# Patient Record
Sex: FEMALE | HISPANIC OR LATINO | Employment: OTHER | URBAN - METROPOLITAN AREA
[De-identification: names, ages, dates, MRNs, and addresses within clinical notes are randomized per-mention and may not be internally consistent; named-entity substitution may affect disease eponyms.]

---

## 2024-03-06 NOTE — PROGRESS NOTES
"Subjective   Patient ID:   [id number]   Brittany Summers is a 68 y.o. female who presents for Allergies (NPV, saw allergist  last year ).    Chief Complaint   Patient presents with    Allergies     NPV, saw allergist  last year       This is a new patient presenting for evaluation of allergy symptoms.    Patient reports a H/O frequent visits to [facility] for C/O rhinorrhea with clear fluid, PND, cough and sneezing Sx and was told she had allergies.  She thought her ere due to frequent \"colds,\" but the cough lingered for weeks.  She describes the cough as dry, forceful and not productive.  Her Sx started after moving here 45 years ago and were triggered by cold weather.      Patient saw Dr. Jose Alfredo Beverly, Allergist at [facility], 10-11-22 and had a skin test positive to Toribio grass and tyrel, maple and cottonwood trees.   She was given Flonase and azelastine nasal sprays, which she used a few weeks but were ineffective.  She has tried antihistamines but she is unsure if they helped.  Eventually the Sx resolved.  The past few weeks she experienced a left earache which she thinks may have been viral-related.    Patient notes she went to [place] last year while it was spring there and 1-2 days later, she was Sx-free.  She went to [place] and had no Sx but developed Sx when she returned here.     Patient will be visiting [facility] 3-17-24 to see her sister where it is rainy and cold.      Patient states she had asthma in the past.    Review of Systems   HENT:  Positive for ear pain.    Respiratory:  Positive for cough.      Objective     /60   Wt 53.5 kg (118 lb)   SpO2 97%      Physical Exam  Constitutional:       Appearance: Normal appearance.   HENT:      Head: Normocephalic and atraumatic.      Right Ear: External ear normal. There is no impacted cerumen.      Left Ear: External ear normal. There is no impacted cerumen.      Nose: No congestion or rhinorrhea.   Eyes:      Extraocular Movements: Extraocular movements " intact.      Conjunctiva/sclera: Conjunctivae normal.      Pupils: Pupils are equal, round, and reactive to light.   Cardiovascular:      Rate and Rhythm: Normal rate and regular rhythm.      Heart sounds: No murmur heard.     No friction rub. No gallop.   Pulmonary:      Effort: No respiratory distress.      Breath sounds: No wheezing, rhonchi or rales.   Skin:     General: Skin is warm and dry.   Neurological:      Mental Status: She is alert.   Psychiatric:         Mood and Affect: Mood normal.         Behavior: Behavior normal.     I reviewed the records from Dr. Beverly's office    Assessment/Plan     Allergic rhinitis  Patient saw Dr. Jose Alfredo Beverly, Allergist at [facility], 10-11-22 and had SPT positive to Toribio grass and tyrel, maple and cottonwood trees.  She used Flonase and azelastine and antihistamines with no relief.    She will start Singulair at the onset of symptoms. If symptoms persist she will start Flonase sensimist 2 spray each nostrils daily    Chronic cough  She C/O dry, nonproductive cough triggered by allergies and cold weather.      She will use the peak flow meter morning and afternoon for one week and check it again when she starts to cough.      By signing my name below, I, Rozina Alejo, attest that this documentation has been prepared under the direction and in the presence of Viviane Patel MD.  All medical record entries made by the Scribe were at my direction and personally dictated by me. I have reviewed the chart and agree that the record accurately reflects my personal performance of the history, physical exam, discussion and plan.

## 2024-03-07 ENCOUNTER — CONSULT (OUTPATIENT)
Dept: ALLERGY | Facility: CLINIC | Age: 69
End: 2024-03-07
Payer: MEDICARE

## 2024-03-07 VITALS — OXYGEN SATURATION: 97 % | DIASTOLIC BLOOD PRESSURE: 60 MMHG | SYSTOLIC BLOOD PRESSURE: 120 MMHG | WEIGHT: 118 LBS

## 2024-03-07 DIAGNOSIS — R05.3 CHRONIC COUGH: ICD-10-CM

## 2024-03-07 DIAGNOSIS — J30.9 ALLERGIC RHINITIS, UNSPECIFIED SEASONALITY, UNSPECIFIED TRIGGER: Primary | ICD-10-CM

## 2024-03-07 PROCEDURE — 99204 OFFICE O/P NEW MOD 45 MIN: CPT | Performed by: ALLERGY & IMMUNOLOGY

## 2024-03-07 RX ORDER — VALACYCLOVIR HYDROCHLORIDE 500 MG/1
500 TABLET, FILM COATED ORAL 2 TIMES DAILY
COMMUNITY

## 2024-03-07 ASSESSMENT — ENCOUNTER SYMPTOMS: COUGH: 1

## 2024-03-07 NOTE — ASSESSMENT & PLAN NOTE
Patient saw Dr. Jose Alfredo Beverly, Allergist at [facility], 10-11-22 and had SPT positive to Toribio grass and tyrel, maple and cottonwood trees.  She used Flonase and azelastine and antihistamines with no relief.    She will start Singulair at the onset of symptoms. If symptoms persist she will start Flonase sensimist 2 spray each nostrils daily

## 2024-03-07 NOTE — ASSESSMENT & PLAN NOTE
She C/O dry, nonproductive cough triggered by allergies and cold weather.      She will use the peak flow meter morning and afternoon for one week and check it again when she starts to cough.

## 2024-03-07 NOTE — PATIENT INSTRUCTIONS
When symptoms start, begin taking Singulair at bedtime to help decrease sinus and lung inflammation.  You can take it with you in [place] if you need it.  Side effects reported vivid dreams,  mood changes as well as ear popping.  If you experience ear popping, this indicates the medication is working so please continue to take it.     If no improvement after 2 weeks on Singulair, start over the counter Flonase Sensimist one spray each nostril two times a day.  To increase the efficacy of your nasal spray, be sure to look down while using it.?  Spray slightly away from the direction of your nasal septum (the bone in the middle of your nose) and only sniff after you have sprayed - avoid spraying and sniffing at the same time, or else a lot of spray will go down your throat.    Use peak flow meter morning and afternoon for one week and check it again when you start to cough.  Keep a log and mail, fax or email it to the office at [email or web address].     Follow up in 6 weeks or sooner if symptoms worsen prior.     Dr. Jose Alfredo Beverly, Allergist at [facility] who performed skin test 10-11-22.

## 2024-04-17 NOTE — PROGRESS NOTES
"Subjective   Patient ID:   [id number]   Brittany Summers is a 68 y.o. female who presents for Allergies (6 week follow up. Did not start singulair. 1 week PFM).    Chief Complaint   Patient presents with    Allergies     6 week follow up. Did not start singulair. 1 week PFM      Since last visit, 3-7-24, patient reports she was prescribed Singulair last visit.  She did perform peak flow meter and notes levels were consistently lower in the morning than in the afternoon.      Patient thinks she only had Sx (sneezing and maybe cough) in the morning but Sx \"disappear\" so she did not start the Singulair.   She will cough for a prolonged period of time after a viral illness.  The last \"cold\" she had was Feb 2024.  She went on her trip to [place] and after she returned 3 weeks ago, but has had no problem since.  Patient does have tea every day and adds a tablespoon of honey.      Patient estimates late June is when she typically starts having symptoms though she is asymptomatic currently.       Objective     /62 (BP Location: Left arm, Patient Position: Sitting, BP Cuff Size: Adult)   Pulse 82   SpO2 100%      Physical Exam  Constitutional:       Appearance: Normal appearance.   HENT:      Head: Normocephalic and atraumatic.      Right Ear: External ear normal. There is no impacted cerumen.      Left Ear: External ear normal. There is no impacted cerumen.      Nose: No congestion or rhinorrhea.   Eyes:      Extraocular Movements: Extraocular movements intact.      Conjunctiva/sclera: Conjunctivae normal.      Pupils: Pupils are equal, round, and reactive to light.   Cardiovascular:      Rate and Rhythm: Normal rate and regular rhythm.      Heart sounds: No murmur heard.     No friction rub. No gallop.   Pulmonary:      Effort: No respiratory distress.      Breath sounds: No wheezing, rhonchi or rales.   Skin:     General: Skin is warm and dry.   Neurological:      Mental Status: She is alert.   Psychiatric:         " "Mood and Affect: Mood normal.         Behavior: Behavior normal.     Assessment/Plan         Allergic rhinitis  This is most likely increasing her frequency of \"cold symptoms\". I would like her to start Singulair at the onset of symptoms and let me know if it is helpful. I am hopeful that this will decrease her lingering post infectious cough.       By signing my name below, I, Mary Alejoibe, attest that this documentation has been prepared under the direction and in the presence of Viviane Patel MD.  All medical record entries made by the Scribe were at my direction and personally dictated by me. I have reviewed the chart and agree that the record accurately reflects my personal performance of the history, physical exam, discussion and plan.     "

## 2024-04-18 ENCOUNTER — OFFICE VISIT (OUTPATIENT)
Dept: ALLERGY | Facility: CLINIC | Age: 69
End: 2024-04-18
Payer: MEDICARE

## 2024-04-18 VITALS — HEART RATE: 82 BPM | DIASTOLIC BLOOD PRESSURE: 62 MMHG | OXYGEN SATURATION: 100 % | SYSTOLIC BLOOD PRESSURE: 166 MMHG

## 2024-04-18 DIAGNOSIS — J30.1 SEASONAL ALLERGIC RHINITIS DUE TO POLLEN: Primary | ICD-10-CM

## 2024-04-18 PROCEDURE — 1159F MED LIST DOCD IN RCRD: CPT | Performed by: ALLERGY & IMMUNOLOGY

## 2024-04-18 PROCEDURE — 1036F TOBACCO NON-USER: CPT | Performed by: ALLERGY & IMMUNOLOGY

## 2024-04-18 PROCEDURE — 99214 OFFICE O/P EST MOD 30 MIN: CPT | Performed by: ALLERGY & IMMUNOLOGY

## 2024-04-18 PROCEDURE — 1160F RVW MEDS BY RX/DR IN RCRD: CPT | Performed by: ALLERGY & IMMUNOLOGY

## 2024-04-18 NOTE — PATIENT INSTRUCTIONS
Start Singulair at onset of symptoms.    Check peak flow meter when you start coughing.  If you restart Singulair and it helps, let me know.    Follow up in 1 year or sooner if symptoms worsen prior.

## 2024-04-22 NOTE — ASSESSMENT & PLAN NOTE
"This is most likely increasing her frequency of \"cold symptoms\". I would like her to start Singulair at the onset of symptoms and let me know if it is helpful. I am hopeful that this will decrease her lingering post infectious cough.   "

## 2024-06-26 ENCOUNTER — APPOINTMENT (OUTPATIENT)
Dept: RHEUMATOLOGY | Facility: CLINIC | Age: 69
End: 2024-06-26
Payer: MEDICARE

## 2024-06-26 VITALS
SYSTOLIC BLOOD PRESSURE: 106 MMHG | HEART RATE: 85 BPM | BODY MASS INDEX: 23.36 KG/M2 | HEIGHT: 60 IN | DIASTOLIC BLOOD PRESSURE: 60 MMHG | WEIGHT: 119 LBS

## 2024-06-26 DIAGNOSIS — M19.041 PRIMARY OSTEOARTHRITIS OF BOTH HANDS: Primary | ICD-10-CM

## 2024-06-26 DIAGNOSIS — M19.042 PRIMARY OSTEOARTHRITIS OF BOTH HANDS: Primary | ICD-10-CM

## 2024-06-26 PROCEDURE — 99213 OFFICE O/P EST LOW 20 MIN: CPT | Performed by: INTERNAL MEDICINE

## 2024-06-26 PROCEDURE — 1126F AMNT PAIN NOTED NONE PRSNT: CPT | Performed by: INTERNAL MEDICINE

## 2024-06-26 PROCEDURE — 1036F TOBACCO NON-USER: CPT | Performed by: INTERNAL MEDICINE

## 2024-06-26 PROCEDURE — 1159F MED LIST DOCD IN RCRD: CPT | Performed by: INTERNAL MEDICINE

## 2024-06-26 ASSESSMENT — PAIN SCALES - GENERAL: PAINLEVEL: 0-NO PAIN

## 2024-06-26 NOTE — PROGRESS NOTES
Informants: Patient and EMR.  PP: 69-year-old female with no significant past medical history.  CC: Pain in fingers.  [place]: She has been in her usual state of health until a few years ago when she began to note some discomfort involving the DIP joint of the digits of both hands.  She has some bony prominence of the DIP joint of the second digit of both hands.  She has not noted any significant morning stiffness, rashes, Raynaud's phenomena, or joint effusions.  She has occasional canker sores for which she has been taking acyclovir.  She notes some blurring of vision, vaginal dryness, occasional vertigo.  She does not take any medications for the pain.  She has continued to be active employed as a .  She developed left rotator cuff tear that was managed conservatively without surgery.  She has been able to maintain full range of motion of the left shoulder without pain.  PH: Allergies: Leflunomide (pruritic rash), omeprazole (rash); illnesses: Allergic rhinitis, left rotator cuff tear; surgeries: None.  SH: She denies any tobacco alcohol or illicit drug use.  She is employed as a , .  FH: Father  93 years of age of old age.  Mother  at 98 years of age of old age.  She has a brother who  with lung cancer and another brother who  with heart disease.  She has a brother alive with hypertension and a brother who is healthy.  She has a sister who  with stomach cancer and 2 sisters were healthy.  She has 1 child who is healthy.  She has a grandmother with asthma.  PX: She is a well-developed, well-nourished, female with benign head, neck, lungs, heart, and abdomen, and extremities.  The musculoskeletal examination does not show any joint effusions.  There is slight bony prominence of the DIP joint of the second digit on both hands.  There is good passive range of motion of the upper and lower extremity joints without joint effusions.  X-ray left shoulder  (4/6/2023)Mild osteoarthritis of the acromioclavicular and mild to moderate arthritis of the glenohumeral joint.  There is osteopenia.  There are no fractures or erosions.    MRI left shoulder (3/21/2023) large full-thickness supraspinatus tendon tear measuring approximately 1.5 cm with retraction of the mid and posterior tendon fibers.  There is tendinosis anterior to the full-thickness tear with possible small partial-thickness tear of the articular surface along the posterior aspect of the tendon and possible full-thickness tear.  There is a small to moderate glenohumeral and subacromial/subdeltoid bursal fluid.  There is synovial thickening in the glenohumeral joint and in the bursa.  There is a split tear of the long head of the biceps tendon and in the superior aspect of the labrum.  There is mild to moderate hypertrophy of the acromioclavicular joint.  X-ray right ankle (5/12/2021) small calcaneal enthesophyte at the insertion of the plantar fascia.  Impression: 69-year-old female with a history of left rotator cuff tear presenting with discomfort in the digits of both hands with mild bony prominence of the DIP joint of the second digit of both hands.  There is no extra-articular manifestations to suggest spondylitis, rheumatoid arthritis, connective tissue disorder or crystal induced arthritis.  Plan: She is to continue being physically active.  She may consider taking acetaminophen as needed for more significant pain.  Consider radiographs of the hands and laboratory for evaluation for any signs of inflammatory arthritis if symptoms were to worsen.  She is to return in 6 months for reevaluation.

## 2024-08-19 DIAGNOSIS — J30.9 ALLERGIC RHINITIS, UNSPECIFIED SEASONALITY, UNSPECIFIED TRIGGER: Primary | ICD-10-CM

## 2024-08-19 RX ORDER — MONTELUKAST SODIUM 10 MG/1
10 TABLET ORAL DAILY
Qty: 30 TABLET | Refills: 6 | Status: SHIPPED | OUTPATIENT
Start: 2024-08-19 | End: 2025-05-16

## 2025-01-08 ENCOUNTER — APPOINTMENT (OUTPATIENT)
Dept: RHEUMATOLOGY | Facility: CLINIC | Age: 70
End: 2025-01-08
Payer: MEDICARE